# Patient Record
Sex: MALE | URBAN - METROPOLITAN AREA
[De-identification: names, ages, dates, MRNs, and addresses within clinical notes are randomized per-mention and may not be internally consistent; named-entity substitution may affect disease eponyms.]

---

## 2019-07-30 ENCOUNTER — APPOINTMENT (RX ONLY)
Dept: URBAN - METROPOLITAN AREA CLINIC 26 | Facility: CLINIC | Age: 49
Setting detail: DERMATOLOGY
End: 2019-07-30

## 2019-07-30 VITALS — HEART RATE: 74 BPM | SYSTOLIC BLOOD PRESSURE: 145 MMHG | DIASTOLIC BLOOD PRESSURE: 100 MMHG

## 2019-07-30 PROBLEM — C44.519 BASAL CELL CARCINOMA OF SKIN OF OTHER PART OF TRUNK: Status: ACTIVE | Noted: 2019-07-30

## 2019-07-30 PROCEDURE — 13101 CMPLX RPR TRUNK 2.6-7.5 CM: CPT

## 2019-07-30 PROCEDURE — ? MOHS SURGERY

## 2019-07-30 PROCEDURE — 17313 MOHS 1 STAGE T/A/L: CPT

## 2024-01-05 NOTE — PROCEDURE: MOHS SURGERY
15 Scc Histology Text: There are irregular masses of epidermal cells in the upper dermis.  Within the tumor masses, there are varying proportions of normal squamous cells and anaplastic squamous cells.  The anaplastic cells have variation in size and shape with hyperplasia and hyperchromasia of the nuclei, absence of intracellular bridges and varying degrees of keratinization.

## 2024-08-26 NOTE — PROCEDURE: MOHS SURGERY
26-Aug-2024 08:00 Partial Purse String (Simple) Text: Given the location of the defect and the characteristics of the surrounding skin a simple purse string closure was deemed most appropriate.  Undermining was performed circumfirentially around the surgical defect.  A purse string suture was then placed and tightened. Wound tension only allowed a partial closure of the circular defect.